# Patient Record
Sex: MALE | Race: BLACK OR AFRICAN AMERICAN | NOT HISPANIC OR LATINO | URBAN - METROPOLITAN AREA
[De-identification: names, ages, dates, MRNs, and addresses within clinical notes are randomized per-mention and may not be internally consistent; named-entity substitution may affect disease eponyms.]

---

## 2022-06-09 ENCOUNTER — OFFICE VISIT (OUTPATIENT)
Dept: URBAN - METROPOLITAN AREA CLINIC 35 | Facility: CLINIC | Age: 32
End: 2022-06-09

## 2022-07-01 ENCOUNTER — OFFICE VISIT (OUTPATIENT)
Dept: URBAN - METROPOLITAN AREA CLINIC 35 | Facility: CLINIC | Age: 32
End: 2022-07-01
Payer: COMMERCIAL

## 2022-07-01 VITALS
WEIGHT: 163 LBS | OXYGEN SATURATION: 98 % | DIASTOLIC BLOOD PRESSURE: 80 MMHG | HEART RATE: 75 BPM | SYSTOLIC BLOOD PRESSURE: 132 MMHG | HEIGHT: 73 IN | BODY MASS INDEX: 21.6 KG/M2

## 2022-07-01 DIAGNOSIS — R12 HEARTBURN: ICD-10-CM

## 2022-07-01 DIAGNOSIS — R10.84 GENERALIZED ABDOMINAL PAIN: ICD-10-CM

## 2022-07-01 DIAGNOSIS — R68.81 EARLY SATIETY: ICD-10-CM

## 2022-07-01 PROCEDURE — 99204 OFFICE O/P NEW MOD 45 MIN: CPT | Performed by: INTERNAL MEDICINE

## 2022-07-01 RX ORDER — DEXLANSOPRAZOLE 60 MG/1
1 CAPSULE CAPSULE, DELAYED RELEASE ORAL ONCE A DAY
Qty: 30 | Refills: 1 | OUTPATIENT
Start: 2022-07-01

## 2022-07-01 RX ORDER — ACETAMINOPHEN 325 MG
1 TABLET AS NEEDED TABLET ORAL
Status: ACTIVE | COMMUNITY

## 2022-07-01 RX ORDER — OMEPRAZOLE 40 MG/1
CAPSULE, DELAYED RELEASE ORAL
Qty: 30 CAPSULE | Status: ACTIVE | COMMUNITY

## 2022-07-01 NOTE — HPI-GERD
32 Year old male patient presents today for GERD, for the past 5 years has a decreased appetite, and reports that he experiences early satiety. He reports that he has a longstanding history of early satiety.  He does not have early satiety with every meal, but frequently.  Pt admits to eating spicy foods and a lot of aciidic foods like tomatoes.  He feels generalized abd pains sometimes from the acid production.  Denies a globus sensation, sour eructations, bloating/gas, indigestion, cough, or changes in bowel habits. He denies completing an EGD.  Patient reports that he feels as "if something isn't right" within his throat and chest. He admits a chest burning sensation that will last for minutes. He reports intermittent use Omeprazole 40mg with mild relief.  He has also tried taking Famotidine at bedtime and states that when he takes the medication he is unable to sleep.

## 2022-07-08 ENCOUNTER — TELEPHONE ENCOUNTER (OUTPATIENT)
Dept: URBAN - METROPOLITAN AREA SURGERY CENTER 8 | Facility: SURGERY CENTER | Age: 32
End: 2022-07-08

## 2022-07-08 RX ORDER — DEXLANSOPRAZOLE 60 MG/1
1 CAPSULE CAPSULE, DELAYED RELEASE ORAL ONCE A DAY
Qty: 90 | Refills: 1
Start: 2022-07-01

## 2022-07-11 ENCOUNTER — TELEPHONE ENCOUNTER (OUTPATIENT)
Dept: URBAN - METROPOLITAN AREA CLINIC 35 | Facility: CLINIC | Age: 32
End: 2022-07-11

## 2022-07-11 ENCOUNTER — OFFICE VISIT (OUTPATIENT)
Dept: URBAN - METROPOLITAN AREA SURGERY CENTER 8 | Facility: SURGERY CENTER | Age: 32
End: 2022-07-11
Payer: COMMERCIAL

## 2022-07-11 ENCOUNTER — TELEPHONE ENCOUNTER (OUTPATIENT)
Dept: URBAN - METROPOLITAN AREA CLINIC 23 | Facility: CLINIC | Age: 32
End: 2022-07-11

## 2022-07-11 ENCOUNTER — CLAIMS CREATED FROM THE CLAIM WINDOW (OUTPATIENT)
Dept: URBAN - METROPOLITAN AREA CLINIC 4 | Facility: CLINIC | Age: 32
End: 2022-07-11
Payer: COMMERCIAL

## 2022-07-11 DIAGNOSIS — R12 BURNING REFLUX: ICD-10-CM

## 2022-07-11 DIAGNOSIS — K31.7 POLYP OF STOMACH AND DUODENUM: ICD-10-CM

## 2022-07-11 DIAGNOSIS — K31.7 BENIGN GASTRIC POLYP: ICD-10-CM

## 2022-07-11 DIAGNOSIS — K31.89 ACQUIRED DEFORMITY OF DUODENUM: ICD-10-CM

## 2022-07-11 DIAGNOSIS — K31.89 OTHER DISEASES OF STOMACH AND DUODENUM: ICD-10-CM

## 2022-07-11 DIAGNOSIS — K21.9 ACID REFLUX: ICD-10-CM

## 2022-07-11 DIAGNOSIS — K21.9 GASTRO-ESOPHAGEAL REFLUX DISEASE WITHOUT ESOPHAGITIS: ICD-10-CM

## 2022-07-11 PROCEDURE — 88312 SPECIAL STAINS GROUP 1: CPT | Performed by: PATHOLOGY

## 2022-07-11 PROCEDURE — 88305 TISSUE EXAM BY PATHOLOGIST: CPT | Performed by: PATHOLOGY

## 2022-07-11 PROCEDURE — 43239 EGD BIOPSY SINGLE/MULTIPLE: CPT | Performed by: INTERNAL MEDICINE

## 2022-07-11 PROCEDURE — G8907 PT DOC NO EVENTS ON DISCHARG: HCPCS | Performed by: INTERNAL MEDICINE

## 2022-07-11 RX ORDER — PANTOPRAZOLE SODIUM 40 MG/1
1 TABLET TABLET, DELAYED RELEASE ORAL
Qty: 30 TABLETS | Refills: 1 | OUTPATIENT
Start: 2022-07-11

## 2022-07-11 RX ORDER — OMEPRAZOLE 40 MG/1
CAPSULE, DELAYED RELEASE ORAL
Qty: 30 CAPSULE | Status: ACTIVE | COMMUNITY

## 2022-07-11 RX ORDER — DEXLANSOPRAZOLE 60 MG/1
1 CAPSULE CAPSULE, DELAYED RELEASE ORAL
Qty: 30 | Refills: 1 | OUTPATIENT
Start: 2022-07-11

## 2022-07-11 RX ORDER — DEXLANSOPRAZOLE 60 MG/1
1 CAPSULE CAPSULE, DELAYED RELEASE ORAL ONCE A DAY
Qty: 90 | Refills: 1 | Status: ACTIVE | COMMUNITY
Start: 2022-07-01

## 2022-07-11 RX ORDER — ACETAMINOPHEN 325 MG
1 TABLET AS NEEDED TABLET ORAL
Status: ACTIVE | COMMUNITY

## 2022-08-10 ENCOUNTER — DASHBOARD ENCOUNTERS (OUTPATIENT)
Age: 32
End: 2022-08-10

## 2022-08-10 ENCOUNTER — OFFICE VISIT (OUTPATIENT)
Dept: URBAN - METROPOLITAN AREA CLINIC 35 | Facility: CLINIC | Age: 32
End: 2022-08-10
Payer: COMMERCIAL

## 2022-08-10 VITALS
HEIGHT: 73 IN | SYSTOLIC BLOOD PRESSURE: 130 MMHG | BODY MASS INDEX: 21.74 KG/M2 | DIASTOLIC BLOOD PRESSURE: 78 MMHG | WEIGHT: 164 LBS

## 2022-08-10 DIAGNOSIS — K21.00 GASTROESOPHAGEAL REFLUX DISEASE WITH ESOPHAGITIS WITHOUT HEMORRHAGE: ICD-10-CM

## 2022-08-10 DIAGNOSIS — R12 HEARTBURN: ICD-10-CM

## 2022-08-10 DIAGNOSIS — K31.7 GASTRIC POLYP: ICD-10-CM

## 2022-08-10 DIAGNOSIS — R68.81 EARLY SATIETY: ICD-10-CM

## 2022-08-10 PROBLEM — 266433003: Status: ACTIVE | Noted: 2022-08-10

## 2022-08-10 PROBLEM — 78809005: Status: ACTIVE | Noted: 2022-08-10

## 2022-08-10 PROCEDURE — 99214 OFFICE O/P EST MOD 30 MIN: CPT | Performed by: PHYSICIAN ASSISTANT

## 2022-08-10 RX ORDER — PANTOPRAZOLE SODIUM 40 MG/1
1 TABLET TABLET, DELAYED RELEASE ORAL
Qty: 30 TABLETS | Refills: 1 | Status: ACTIVE | COMMUNITY
Start: 2022-07-11

## 2022-08-10 RX ORDER — ACETAMINOPHEN 325 MG
1 TABLET AS NEEDED TABLET ORAL
Status: ON HOLD | COMMUNITY

## 2022-08-10 RX ORDER — OMEPRAZOLE 40 MG/1
CAPSULE, DELAYED RELEASE ORAL
Qty: 30 CAPSULE | Status: ON HOLD | COMMUNITY

## 2022-08-10 RX ORDER — DEXLANSOPRAZOLE 60 MG/1
1 CAPSULE CAPSULE, DELAYED RELEASE ORAL
Qty: 30 | Refills: 1 | Status: ON HOLD | COMMUNITY
Start: 2022-07-11

## 2022-08-10 RX ORDER — DEXLANSOPRAZOLE 60 MG/1
1 CAPSULE CAPSULE, DELAYED RELEASE ORAL ONCE A DAY
Qty: 90 | Refills: 1 | Status: ON HOLD | COMMUNITY
Start: 2022-07-01

## 2022-08-10 NOTE — HPI-GERD
Patient presents today to review his EGD results that was completed by Dr. Rousseau. He denies any complications after his procedure.   He was recommended to start Pantoprazole 40 mg 1 PO QD due to Omeprazole not controlling his symptoms and Dexilant being to expensive. He states that Pantoprazole 40 mg 1 PO QD is helping to control any symtpoms that he may expeience.   Since his procedure he denies dysphagia, globus, a change in appetite or bowel habits.    EGD Findings: 7/11/2022 - Z-line regular, 41 cm from the incisors. - Normal esophagus.   - Erythematous mucosa in the gastric body.  - Normal antrum.   - A single fundic gastric polyp.  - Normal pylorus. - Normal examined duodenum.    EGD Path:   Duodenum, Second Part (D2) - NO SIGNIFICANT ABNORMALITY.  Stomach, Antrum - NO SIGNIFICANT ABNORMALITY. Stomach, Body -  NO SIGNIFICANT ABNORMALITY. Stomach, Body - FUNDIC GLAND POLYP. No Evidence of H. Pylori Organisms or Intestinal Metaplasia.  Negative for Dysplasia or Malignancy. Esophagus, Lower-Third - SQUAMOUS MUCOSA WITH REFLUX-TYPE CHANGES; NO COLUMNAR MUCOSA    Last visit (7/1/2022)  32 Year old male patient presents today for GERD, for the past 5 years has a decreased appetite, and reports that he experiences early satiety. He reports that he has a longstanding history of early satiety.  He does not have early satiety with every meal, but frequently.  Pt admits to eating spicy foods and a lot of aciidic foods like tomatoes.  He feels generalized abd pains sometimes from the acid production.  Denies a globus sensation, sour eructations, bloating/gas, indigestion, cough, or changes in bowel habits. He denies completing an EGD.  Patient reports that he feels as "if something isn't right" within his throat and chest. He admits a chest burning sensation that will last for minutes. He reports intermittent use Omeprazole 40mg with mild relief.  He has also tried taking Famotidine at bedtime and states that when he takes the medication he is unable to sleep.